# Patient Record
Sex: FEMALE | Race: WHITE | NOT HISPANIC OR LATINO | Employment: FULL TIME | URBAN - METROPOLITAN AREA
[De-identification: names, ages, dates, MRNs, and addresses within clinical notes are randomized per-mention and may not be internally consistent; named-entity substitution may affect disease eponyms.]

---

## 2020-05-23 ENCOUNTER — HOSPITAL ENCOUNTER (EMERGENCY)
Facility: HOSPITAL | Age: 75
Discharge: HOME | End: 2020-05-23
Attending: EMERGENCY MEDICINE | Admitting: EMERGENCY MEDICINE

## 2020-05-23 ENCOUNTER — APPOINTMENT (OUTPATIENT)
Dept: CT IMAGING | Facility: HOSPITAL | Age: 75
End: 2020-05-23

## 2020-05-23 ENCOUNTER — APPOINTMENT (OUTPATIENT)
Dept: GENERAL RADIOLOGY | Facility: HOSPITAL | Age: 75
End: 2020-05-23

## 2020-05-23 VITALS
DIASTOLIC BLOOD PRESSURE: 76 MMHG | TEMPERATURE: 98.8 F | HEIGHT: 57 IN | HEART RATE: 81 BPM | WEIGHT: 120 LBS | OXYGEN SATURATION: 99 % | SYSTOLIC BLOOD PRESSURE: 115 MMHG | BODY MASS INDEX: 25.89 KG/M2 | RESPIRATION RATE: 16 BRPM

## 2020-05-23 DIAGNOSIS — D72.829 LEUKOCYTOSIS, UNSPECIFIED TYPE: ICD-10-CM

## 2020-05-23 DIAGNOSIS — K59.00 CONSTIPATION, UNSPECIFIED CONSTIPATION TYPE: Primary | ICD-10-CM

## 2020-05-23 DIAGNOSIS — R93.5 ABNORMAL CT OF THE ABDOMEN: ICD-10-CM

## 2020-05-23 LAB
ALBUMIN SERPL-MCNC: 3.7 G/DL (ref 3.5–5.2)
ALBUMIN/GLOB SERPL: 1.1 G/DL
ALP SERPL-CCNC: 70 U/L (ref 39–117)
ALT SERPL W P-5'-P-CCNC: 9 U/L (ref 1–33)
ANION GAP SERPL CALCULATED.3IONS-SCNC: 11.1 MMOL/L (ref 5–15)
AST SERPL-CCNC: 15 U/L (ref 1–32)
BACTERIA UR QL AUTO: ABNORMAL /HPF
BASOPHILS # BLD AUTO: 0.05 10*3/MM3 (ref 0–0.2)
BASOPHILS NFR BLD AUTO: 0.3 % (ref 0–1.5)
BILIRUB SERPL-MCNC: 0.5 MG/DL (ref 0.2–1.2)
BILIRUB UR QL STRIP: NEGATIVE
BUN BLD-MCNC: 16 MG/DL (ref 8–23)
BUN/CREAT SERPL: 15.7 (ref 7–25)
CALCIUM SPEC-SCNC: 8.6 MG/DL (ref 8.6–10.5)
CHLORIDE SERPL-SCNC: 93 MMOL/L (ref 98–107)
CLARITY UR: ABNORMAL
CO2 SERPL-SCNC: 25.9 MMOL/L (ref 22–29)
COLOR UR: ABNORMAL
CREAT BLD-MCNC: 1.02 MG/DL (ref 0.57–1)
DEPRECATED RDW RBC AUTO: 45.8 FL (ref 37–54)
EOSINOPHIL # BLD AUTO: 0.05 10*3/MM3 (ref 0–0.4)
EOSINOPHIL NFR BLD AUTO: 0.3 % (ref 0.3–6.2)
ERYTHROCYTE [DISTWIDTH] IN BLOOD BY AUTOMATED COUNT: 15.5 % (ref 12.3–15.4)
GFR SERPL CREATININE-BSD FRML MDRD: 53 ML/MIN/1.73
GLOBULIN UR ELPH-MCNC: 3.5 GM/DL
GLUCOSE BLD-MCNC: 125 MG/DL (ref 65–99)
GLUCOSE UR STRIP-MCNC: ABNORMAL MG/DL
HCT VFR BLD AUTO: 30.8 % (ref 34–46.6)
HGB BLD-MCNC: 10.4 G/DL (ref 12–15.9)
HGB UR QL STRIP.AUTO: ABNORMAL
HYALINE CASTS UR QL AUTO: ABNORMAL /LPF
IMM GRANULOCYTES # BLD AUTO: 0.16 10*3/MM3 (ref 0–0.05)
IMM GRANULOCYTES NFR BLD AUTO: 1 % (ref 0–0.5)
KETONES UR QL STRIP: ABNORMAL
LEUKOCYTE ESTERASE UR QL STRIP.AUTO: NEGATIVE
LIPASE SERPL-CCNC: 31 U/L (ref 13–60)
LYMPHOCYTES # BLD AUTO: 0.97 10*3/MM3 (ref 0.7–3.1)
LYMPHOCYTES NFR BLD AUTO: 5.9 % (ref 19.6–45.3)
MCH RBC QN AUTO: 27.4 PG (ref 26.6–33)
MCHC RBC AUTO-ENTMCNC: 33.8 G/DL (ref 31.5–35.7)
MCV RBC AUTO: 81.1 FL (ref 79–97)
MONOCYTES # BLD AUTO: 1.69 10*3/MM3 (ref 0.1–0.9)
MONOCYTES NFR BLD AUTO: 10.2 % (ref 5–12)
NEUTROPHILS # BLD AUTO: 13.65 10*3/MM3 (ref 1.7–7)
NEUTROPHILS NFR BLD AUTO: 82.3 % (ref 42.7–76)
NITRITE UR QL STRIP: NEGATIVE
NRBC BLD AUTO-RTO: 0 /100 WBC (ref 0–0.2)
PH UR STRIP.AUTO: <=5 [PH] (ref 5–8)
PLATELET # BLD AUTO: 263 10*3/MM3 (ref 140–450)
PMV BLD AUTO: 11.2 FL (ref 6–12)
POTASSIUM BLD-SCNC: 3.2 MMOL/L (ref 3.5–5.2)
PROT SERPL-MCNC: 7.2 G/DL (ref 6–8.5)
PROT UR QL STRIP: ABNORMAL
RBC # BLD AUTO: 3.8 10*6/MM3 (ref 3.77–5.28)
RBC # UR: ABNORMAL /HPF
REF LAB TEST METHOD: ABNORMAL
SODIUM BLD-SCNC: 130 MMOL/L (ref 136–145)
SP GR UR STRIP: >=1.03 (ref 1–1.03)
SQUAMOUS #/AREA URNS HPF: ABNORMAL /HPF
UROBILINOGEN UR QL STRIP: ABNORMAL
WBC NRBC COR # BLD: 16.57 10*3/MM3 (ref 3.4–10.8)
WBC UR QL AUTO: ABNORMAL /HPF

## 2020-05-23 PROCEDURE — 81001 URINALYSIS AUTO W/SCOPE: CPT | Performed by: EMERGENCY MEDICINE

## 2020-05-23 PROCEDURE — 74177 CT ABD & PELVIS W/CONTRAST: CPT

## 2020-05-23 PROCEDURE — 74018 RADEX ABDOMEN 1 VIEW: CPT

## 2020-05-23 PROCEDURE — 99285 EMERGENCY DEPT VISIT HI MDM: CPT

## 2020-05-23 PROCEDURE — 87086 URINE CULTURE/COLONY COUNT: CPT | Performed by: EMERGENCY MEDICINE

## 2020-05-23 PROCEDURE — 83690 ASSAY OF LIPASE: CPT | Performed by: EMERGENCY MEDICINE

## 2020-05-23 PROCEDURE — 96360 HYDRATION IV INFUSION INIT: CPT

## 2020-05-23 PROCEDURE — 80053 COMPREHEN METABOLIC PANEL: CPT | Performed by: EMERGENCY MEDICINE

## 2020-05-23 PROCEDURE — 25010000002 IOPAMIDOL 61 % SOLUTION: Performed by: EMERGENCY MEDICINE

## 2020-05-23 PROCEDURE — 85025 COMPLETE CBC W/AUTO DIFF WBC: CPT | Performed by: EMERGENCY MEDICINE

## 2020-05-23 RX ORDER — BISACODYL 10 MG
10 SUPPOSITORY, RECTAL RECTAL DAILY PRN
Qty: 28 SUPPOSITORY | Refills: 0 | Status: SHIPPED | OUTPATIENT
Start: 2020-05-23

## 2020-05-23 RX ORDER — POTASSIUM CHLORIDE 750 MG/1
40 CAPSULE, EXTENDED RELEASE ORAL ONCE
Status: COMPLETED | OUTPATIENT
Start: 2020-05-23 | End: 2020-05-23

## 2020-05-23 RX ORDER — POLYETHYLENE GLYCOL 3350 17 G/17G
17 POWDER, FOR SOLUTION ORAL DAILY
Qty: 510 G | Refills: 0 | Status: SHIPPED | OUTPATIENT
Start: 2020-05-23 | End: 2020-06-22

## 2020-05-23 RX ORDER — AMOXICILLIN 250 MG
1 CAPSULE ORAL DAILY
Qty: 30 TABLET | Refills: 0 | Status: SHIPPED | OUTPATIENT
Start: 2020-05-23 | End: 2020-06-22

## 2020-05-23 RX ADMIN — POTASSIUM CHLORIDE 40 MEQ: 10 CAPSULE, COATED, EXTENDED RELEASE ORAL at 17:43

## 2020-05-23 RX ADMIN — IOPAMIDOL 85 ML: 612 INJECTION, SOLUTION INTRAVENOUS at 18:10

## 2020-05-23 RX ADMIN — SODIUM CHLORIDE 1000 ML: 9 INJECTION, SOLUTION INTRAVENOUS at 17:43

## 2020-05-23 RX ADMIN — MINERAL OIL 200 ML: 471.95 OIL ORAL at 20:59

## 2020-05-23 NOTE — ED TRIAGE NOTES
Pt reports abd. Pain x 1 week went to PCP given antibiotic states unsure for what. Pt states not had BM 1 week. Pt arrives in triage with mask on. Triage staff wearing masks.

## 2020-05-23 NOTE — ED PROVIDER NOTES
EMERGENCY DEPARTMENT ENCOUNTER    Room Number:  33/33  Date of encounter:  5/23/2020  PCP: Provider, No Known    HPI:  Context: Nereida Carvajal is a 74 y.o. female who presents to the ED c/o chief complaint of difficulty having bowel movement since last Saturday.  Patient states last Saturday had a normal bowel movement.  Patient states since that time she has felt constipated.  Patient has had small-volume bowel movements, small amount, mild straining.  Patient endorses occasional cramping, intermittent, no severe pain, no persistent pain.  Patient did have emesis on 1 day after taking a laxative, no other emesis, no recent emesis.  Patient denies any dysuria, no hematuria, no increase in urinary frequency or urgency.  Patient denies any abdominal distention.  No fever shakes chills or night sweats.  No history of prior abdominal surgeries.  No history of prior bowel obstructions.    MEDICAL HISTORY REVIEW  Reviewed in EPIC    PAST MEDICAL HISTORY  Active Ambulatory Problems     Diagnosis Date Noted   • No Active Ambulatory Problems     Resolved Ambulatory Problems     Diagnosis Date Noted   • No Resolved Ambulatory Problems     No Additional Past Medical History       PAST SURGICAL HISTORY  History reviewed. No pertinent surgical history.    FAMILY HISTORY  History reviewed. No pertinent family history.    SOCIAL HISTORY  Social History     Socioeconomic History   • Marital status:      Spouse name: Not on file   • Number of children: Not on file   • Years of education: Not on file   • Highest education level: Not on file       ALLERGIES  Sulfa antibiotics    The patient's allergies have been reviewed    REVIEW OF SYSTEMS  All systems reviewed and negative except for those discussed in HPI.     PHYSICAL EXAM  I have reviewed the triage vital signs and nursing notes.  ED Triage Vitals [05/23/20 1529]   Temp Heart Rate Resp BP SpO2   98.8 °F (37.1 °C) 107 18 -- 97 %      Temp src Heart Rate Source Patient  Position BP Location FiO2 (%)   Tympanic -- -- -- --       GENERAL: No acute distress  HENT: NCAT, PERRL, Nares patent  EYES: no scleral icterus  NECK: trachea midline, no ROM limitations  CV: regular rhythm, regular rate  RESPIRATORY: normal effort  ABDOMEN: soft, nondistended, nontender palpation, normal bowel sounds  : Rectal exam performed.  Chaperone present throughout exam, Marybeth ED technician  External exam normal.  No visualized external hemorrhoids, no palpated internal hemorrhoids.  No visible anal fissures.  No gross blood.  No fecal impaction .  MUSCULOSKELETAL: no deformity  NEURO: alert, moves all extremities, follows commands  SKIN: warm, dry    LAB RESULTS  Recent Results (from the past 24 hour(s))   Comprehensive Metabolic Panel    Collection Time: 05/23/20  4:30 PM   Result Value Ref Range    Glucose 125 (H) 65 - 99 mg/dL    BUN 16 8 - 23 mg/dL    Creatinine 1.02 (H) 0.57 - 1.00 mg/dL    Sodium 130 (L) 136 - 145 mmol/L    Potassium 3.2 (L) 3.5 - 5.2 mmol/L    Chloride 93 (L) 98 - 107 mmol/L    CO2 25.9 22.0 - 29.0 mmol/L    Calcium 8.6 8.6 - 10.5 mg/dL    Total Protein 7.2 6.0 - 8.5 g/dL    Albumin 3.70 3.50 - 5.20 g/dL    ALT (SGPT) 9 1 - 33 U/L    AST (SGOT) 15 1 - 32 U/L    Alkaline Phosphatase 70 39 - 117 U/L    Total Bilirubin 0.5 0.2 - 1.2 mg/dL    eGFR Non African Amer 53 (L) >60 mL/min/1.73    Globulin 3.5 gm/dL    A/G Ratio 1.1 g/dL    BUN/Creatinine Ratio 15.7 7.0 - 25.0    Anion Gap 11.1 5.0 - 15.0 mmol/L   Lipase    Collection Time: 05/23/20  4:30 PM   Result Value Ref Range    Lipase 31 13 - 60 U/L   CBC Auto Differential    Collection Time: 05/23/20  4:30 PM   Result Value Ref Range    WBC 16.57 (H) 3.40 - 10.80 10*3/mm3    RBC 3.80 3.77 - 5.28 10*6/mm3    Hemoglobin 10.4 (L) 12.0 - 15.9 g/dL    Hematocrit 30.8 (L) 34.0 - 46.6 %    MCV 81.1 79.0 - 97.0 fL    MCH 27.4 26.6 - 33.0 pg    MCHC 33.8 31.5 - 35.7 g/dL    RDW 15.5 (H) 12.3 - 15.4 %    RDW-SD 45.8 37.0 - 54.0 fl    MPV  11.2 6.0 - 12.0 fL    Platelets 263 140 - 450 10*3/mm3    Neutrophil % 82.3 (H) 42.7 - 76.0 %    Lymphocyte % 5.9 (L) 19.6 - 45.3 %    Monocyte % 10.2 5.0 - 12.0 %    Eosinophil % 0.3 0.3 - 6.2 %    Basophil % 0.3 0.0 - 1.5 %    Immature Grans % 1.0 (H) 0.0 - 0.5 %    Neutrophils, Absolute 13.65 (H) 1.70 - 7.00 10*3/mm3    Lymphocytes, Absolute 0.97 0.70 - 3.10 10*3/mm3    Monocytes, Absolute 1.69 (H) 0.10 - 0.90 10*3/mm3    Eosinophils, Absolute 0.05 0.00 - 0.40 10*3/mm3    Basophils, Absolute 0.05 0.00 - 0.20 10*3/mm3    Immature Grans, Absolute 0.16 (H) 0.00 - 0.05 10*3/mm3    nRBC 0.0 0.0 - 0.2 /100 WBC   Urinalysis With Microscopic If Indicated (No Culture) - Urine, Clean Catch    Collection Time: 05/23/20  4:38 PM   Result Value Ref Range    Color, UA Orange (A) Yellow, Straw    Appearance, UA Slightly Cloudy (A) Clear    pH, UA <=5.0 5.0 - 8.0    Specific Gravity, UA >=1.030 1.005 - 1.030    Glucose,  mg/dL (Trace) (A) Negative    Ketones, UA Trace (A) Negative    Bilirubin, UA Negative Negative    Blood, UA Trace (A) Negative    Protein,  mg/dL (2+) (A) Negative    Leuk Esterase, UA Negative Negative    Nitrite, UA Negative Negative    Urobilinogen, UA 1.0 E.U./dL 0.2 - 1.0 E.U./dL   Urinalysis, Microscopic Only - Urine, Clean Catch    Collection Time: 05/23/20  4:38 PM   Result Value Ref Range    RBC, UA 0-2 None Seen, 0-2 /HPF    WBC, UA 6-12 (A) None Seen, 0-2 /HPF    Bacteria, UA 2+ (A) None Seen /HPF    Squamous Epithelial Cells, UA 3-6 (A) None Seen, 0-2 /HPF    Hyaline Casts, UA 3-6 None Seen /LPF    Methodology Automated Microscopy        I ordered the above labs and reviewed the results.    RADIOLOGY  Ct Abdomen Pelvis With Contrast    Result Date: 5/23/2020  CT OF THE ABDOMEN AND PELVIS WITH CONTRAST 05/23/2020  HISTORY: Constipation. Abdominal cramping.  Spiral images were obtained from the lung bases to the symphysis pubis after intravenous contrast. No oral contrast was given.  There  are no previous studies available for comparison.  There is a low-density lesion in the posterior aspect of the liver measuring approximately 2.3 cm. This contains some calcification. This is of uncertain etiology but could represent a benign finding such as an old hematoma which is partially calcified. The low-attenuation portion of this lesion demonstrates a CT number of 33. No other liver lesions are seen.  The gallbladder, spleen, pancreas, adrenals and kidneys appear unremarkable except for a left renal cyst.  There is stranding and small amount of fluid surrounding a moderate length portion of the sigmoid colon. There may be some minimal wall thickening of the lower sigmoid colon. Large amount of stool with distention of the sigmoid colon is seen. It is distended to a diameter of approximately 5.2 cm. Remainder of the colon demonstrates small to moderate amount of stool but does not appear particularly dilated. There may be minimal colonic diverticulosis.  No small bowel dilatation is seen.      1. Mild to moderate dilatation of the sigmoid colon with some surrounding stranding and tiny amount of fluid. There is minimal wall thickening of the lower sigmoid colon and rectum. Findings could represent mild colitis. 2. No abscess or free air is seen. 3. Consider short-term follow-up CT of the abdomen and pelvis or follow-up colonoscopy. 4. Low-density lesion in the posterior liver demonstrating some calcification. This is probably benign such as an old hematoma with some calcification. Consider correlation to any prior outside CT scans of the abdomen. If none are available, follow-up CT of the abdomen with contrast in approximately 4 months to 6 months recommended. 5. Findings were discussed with Dr. Collins.  Radiation dose reduction techniques were utilized, including automated exposure control and exposure modulation based on body size.  This report was finalized on 5/23/2020 8:06 PM by Dr. Villa Zhu,  M.D.      Xr Abdomen Kub    Result Date: 5/23/2020  XR ABDOMEN KUB-  5/23/2020  HISTORY: Constipation.  There is a moderate amount of stool in the rectosigmoid colon. No bowel dilatation is seen. At least 3 tiny calcifications project over the right upper quadrant possibly tiny right renal stones. Right pelvic surgical clips are seen.      1. Moderate amount of stool in the rectosigmoid colon. 2. Calcifications projecting over the right upper quadrant may represent right renal stones. 3. No evidence of bowel obstruction.  This report was finalized on 5/23/2020 5:17 PM by Dr. Villa Zhu M.D.        I ordered the above noted radiological studies. I reviewed the images and results. I agree with the radiologist interpretation.    PROCEDURES  Procedures    MEDICATIONS GIVEN IN ER  Medications   sodium chloride 0.9 % bolus 1,000 mL (1,000 mL Intravenous New Bag 5/23/20 1743)   potassium chloride (MICRO-K) CR capsule 40 mEq (40 mEq Oral Given 5/23/20 1743)   iopamidol (ISOVUE-300) 61 % injection 100 mL (85 mL Intravenous Given by Other 5/23/20 1810)   mineral oil-magnesium hydroxide-glycerin-sorbitol (MMGS) enema (200 mL Rectal Given 5/23/20 2059)       PROGRESS, DATA ANALYSIS, CONSULTS, AND MEDICAL DECISION MAKING  A complete history and physical exam have been performed.  All available laboratory and imaging results have been reviewed by myself prior to disposition.  Face mask and gloves were worn throughout the patient encounter, unless additional PPE was worn and specified below. Hand hygiene was performed before entering and after leaving the patient room.  MDM    ED Course as of May 23 2151   Sat May 23, 2020   1619 Discussed pertinent information from history and physical exam with patient.  Discussed differential diagnosis.  Discussed plan for ED evaluation/work-up/treatment.  All questions answered.  Patient is agreeable with plan.        [JG]   5067 Patient noted to have significant leukocytosis.  Will  obtain CT imaging of the abdomen to evaluate for acute acute pathology.    [JG]   1850 Phone call with radiologist.  I had previously reviewed the images. I discussed the patient, imaging, and their interpretation.  See dictated report for final interpretation.        [JG]   1917 2+ bacteria with white blood cells in the urine, leukocyte negative, nitrite negative, likely contamination given squamous cells.  Patient denies any urinary symptoms.  Will send urine for culture.    [JG]   1918 CMP shows mild hyponatremia, status post normal saline.  Mild hypokalemia, repleted.  No other significant electrolyte disturbances.    [JG]   1941 CT imaging shows large stool burden in sigmoid colon with distention, mild surrounding inflammatory changes.  CT read mention possible colitis although patient denies any blood in her stool, mucus in her stool.  Patient may have stercoral colitis secondary to distention from stool burden.  Will attempt enema and reassess patient afterwards.    [JG]   2126 Patient status post enema, will reassess.    [JG]   2147 Patient reassessed status post enema, did have a small amount of stool, symptomatic improvement.  Discussed plan for discharge with bowel regimen, primary care follow-up, GI follow-up, extensive discussion return precautions.  Patient is comfortable with plan, no questions or concerns.    [JG]   2147 The patient was reexamined.  They have had symptomatic improvement during their ED stay.  I discussed today's findings with the patient, explaining the pertinent positives and negatives from today's visit, and the plan of care.  Discussed plan for discharge as there is no emergent indication for admission.  Discussed limitation of the ED work-up and that this is to rule out life-threatening emergencies but that they could require further testing as determined by their primary care and or any referred specialist patient is agreeable and understands need for follow-up and repeat  exam/testing.  Patient is aware that discharge does not mean there is nothing wrong, indicates no emergency is present, and that they must continue their care with their primary care physician and/or any referred specialist.  They were given appropriate follow-up with their primary care physician and/or specialist.  I had an extensive discussion on the expected clinical course and return precautions.  Patient understands to return to the emergency department for continuation, worsening, or new symptoms.  I answered any of the patient's questions. Patient was discharged home in a stable condition.        [JG]      ED Course User Index  [JG] Mitchell Collins MD       AS OF 21:51 VITALS:    BP - 106/65  HR - 96  TEMP - 98.8 °F (37.1 °C) (Tympanic)  O2 SATS - 99%    DIAGNOSIS  Final diagnoses:   Constipation, unspecified constipation type   Leukocytosis, unspecified type   Abnormal CT of the abdomen         DISPOSITION  DISCHARGE    Patient discharged in stable condition.    Reviewed implications of results, diagnosis, meds, responsibility to follow up, warning signs and symptoms of possible worsening, potential complications and reasons to return to ER.    Patient/Family voiced understanding of above instructions.    Discussed plan for discharge, as there is no emergent indication for admission. Patient referred to primary care provider for BP management due to today's BP. Pt/family is agreeable and understands need for follow up and repeat testing.  Pt is aware that discharge does not mean that nothing is wrong but it indicates no emergency is present that requires admission and they must continue care with follow-up as given below or physician of their choice.     FOLLOW-UP  Your PCP    Schedule an appointment as soon as possible for a visit in 2 days  even if well    [facility] [place] [id number] [phone]    if you are unable to follow up with your PCP    Femi Turner,  MD  [address] [address]  [phone]    Schedule an appointment as soon as possible for a visit in 2 days           Medication List      New Prescriptions    bisacodyl 10 MG suppository  Commonly known as:  Dulcolax  Insert 1 suppository into the rectum Daily As Needed for Constipation.     polyethylene glycol 17 GM/SCOOP powder  Commonly known as:  MIRALAX  Take 17 g by mouth Daily for 30 days.     sennosides-docusate 8.6-50 MG per tablet  Commonly known as:  PERICOLACE  Take 1 tablet by mouth Daily for 30 days.           Mitchell Collins MD  05/23/20 9127

## 2020-05-24 LAB — BACTERIA SPEC AEROBE CULT: ABNORMAL

## 2020-05-24 NOTE — ED NOTES
This RN assisted pt to bedside commode. Pt had BM, clear liquid from the enema given mixed with small amount of water brown stool. Pt reports 5/10 abd cramping at this time. Call light within reach, will continue to monitor.      Pepper Rodriguez, RN  05/23/20 9845

## 2020-05-24 NOTE — ED NOTES
Pt had another clear, watery BM. No stool noted in this BM. Pt reports ABD cramping has subsided.      Pepper Rodriguez RN  05/23/20 0290

## 2020-05-24 NOTE — ED NOTES
This RN wearing all appropriate PPE during any and all periods of contact w/ patient and at all times while in patient care areas; patient wearing mask at all times when this RN is present in exam room.     Pepper Rodriguez, RN  05/23/20 5303

## 2021-02-18 ENCOUNTER — IMMUNIZATION (OUTPATIENT)
Dept: VACCINE CLINIC | Facility: HOSPITAL | Age: 76
End: 2021-02-18

## 2021-02-18 PROCEDURE — 0001A: CPT | Performed by: INTERNAL MEDICINE

## 2021-02-18 PROCEDURE — 91300 HC SARSCOV02 VAC 30MCG/0.3ML IM: CPT | Performed by: INTERNAL MEDICINE

## 2021-03-12 ENCOUNTER — IMMUNIZATION (OUTPATIENT)
Dept: VACCINE CLINIC | Facility: HOSPITAL | Age: 76
End: 2021-03-12

## 2021-03-12 PROCEDURE — 0002A: CPT | Performed by: INTERNAL MEDICINE

## 2021-03-12 PROCEDURE — 91300 HC SARSCOV02 VAC 30MCG/0.3ML IM: CPT | Performed by: INTERNAL MEDICINE
